# Patient Record
Sex: FEMALE | Race: WHITE | NOT HISPANIC OR LATINO | URBAN - METROPOLITAN AREA
[De-identification: names, ages, dates, MRNs, and addresses within clinical notes are randomized per-mention and may not be internally consistent; named-entity substitution may affect disease eponyms.]

---

## 2020-02-25 ENCOUNTER — OFFICE (OUTPATIENT)
Dept: URBAN - METROPOLITAN AREA CLINIC 101 | Facility: CLINIC | Age: 85
End: 2020-02-25
Payer: OTHER GOVERNMENT

## 2020-02-25 VITALS
WEIGHT: 174 LBS | TEMPERATURE: 98.1 F | HEART RATE: 80 BPM | DIASTOLIC BLOOD PRESSURE: 62 MMHG | SYSTOLIC BLOOD PRESSURE: 101 MMHG | HEIGHT: 64 IN

## 2020-02-25 DIAGNOSIS — K30 FUNCTIONAL DYSPEPSIA: ICD-10-CM

## 2020-02-25 DIAGNOSIS — K21.0 GASTRO-ESOPHAGEAL REFLUX DISEASE WITH ESOPHAGITIS: ICD-10-CM

## 2020-02-25 DIAGNOSIS — R13.19 OTHER DYSPHAGIA: ICD-10-CM

## 2020-02-25 PROCEDURE — 99204 OFFICE O/P NEW MOD 45 MIN: CPT

## 2020-06-18 ENCOUNTER — ON CAMPUS - OUTPATIENT (OUTPATIENT)
Dept: URBAN - METROPOLITAN AREA HOSPITAL 37 | Facility: HOSPITAL | Age: 85
End: 2020-06-18
Payer: OTHER GOVERNMENT

## 2020-06-18 DIAGNOSIS — K29.60 OTHER GASTRITIS WITHOUT BLEEDING: ICD-10-CM

## 2020-06-18 DIAGNOSIS — R13.10 DYSPHAGIA, UNSPECIFIED: ICD-10-CM

## 2020-06-18 DIAGNOSIS — K21.9 GASTRO-ESOPHAGEAL REFLUX DISEASE WITHOUT ESOPHAGITIS: ICD-10-CM

## 2020-06-18 PROCEDURE — 43239 EGD BIOPSY SINGLE/MULTIPLE: CPT | Performed by: INTERNAL MEDICINE

## 2020-09-21 ENCOUNTER — OFFICE (OUTPATIENT)
Dept: URBAN - METROPOLITAN AREA CLINIC 102 | Facility: CLINIC | Age: 85
End: 2020-09-21
Payer: OTHER GOVERNMENT

## 2020-09-21 VITALS
HEIGHT: 64 IN | SYSTOLIC BLOOD PRESSURE: 119 MMHG | TEMPERATURE: 97.9 F | HEART RATE: 96 BPM | DIASTOLIC BLOOD PRESSURE: 75 MMHG | WEIGHT: 171 LBS

## 2020-09-21 DIAGNOSIS — R13.19 OTHER DYSPHAGIA: ICD-10-CM

## 2020-09-21 DIAGNOSIS — K21.0 GASTRO-ESOPHAGEAL REFLUX DISEASE WITH ESOPHAGITIS: ICD-10-CM

## 2020-09-21 DIAGNOSIS — R07.89 OTHER CHEST PAIN: ICD-10-CM

## 2020-09-21 PROCEDURE — 99214 OFFICE O/P EST MOD 30 MIN: CPT | Performed by: NURSE PRACTITIONER

## 2020-09-21 RX ORDER — PANTOPRAZOLE SODIUM 40 MG/1
TABLET, DELAYED RELEASE ORAL
Qty: 90 | Refills: 3 | Status: ACTIVE
Start: 2020-09-21

## 2020-09-21 NOTE — SERVICEHPINOTES
CAMI HORTON   is a   88  female who presents for follow up. The chest pain got worse. + Acid reflux. Has been taking Omeprazole 40 mg BID. Saturday, had severe chest pain that radiated to the throat and burned. Still has dysphagia, chocks on everything both liquids and solids. The appetite is poor. Lost about 3 lbs since Feb. Gets full quickly. BRMoves bowel daily. Denies blood in stool, melena, constipation or diarrhea. Occasional lower abdominal pain, chronic. + Gas pain. BR

## 2021-04-01 ENCOUNTER — OFFICE (OUTPATIENT)
Dept: URBAN - METROPOLITAN AREA CLINIC 102 | Facility: CLINIC | Age: 86
End: 2021-04-01
Payer: OTHER GOVERNMENT

## 2021-04-01 VITALS
DIASTOLIC BLOOD PRESSURE: 88 MMHG | SYSTOLIC BLOOD PRESSURE: 140 MMHG | HEIGHT: 64 IN | TEMPERATURE: 97.9 F | HEART RATE: 78 BPM | WEIGHT: 169 LBS

## 2021-04-01 DIAGNOSIS — R10.84 GENERALIZED ABDOMINAL PAIN: ICD-10-CM

## 2021-04-01 DIAGNOSIS — K92.1 MELENA: ICD-10-CM

## 2021-04-01 DIAGNOSIS — R19.4 CHANGE IN BOWEL HABIT: ICD-10-CM

## 2021-04-01 DIAGNOSIS — R07.89 OTHER CHEST PAIN: ICD-10-CM

## 2021-04-01 DIAGNOSIS — R13.19 OTHER DYSPHAGIA: ICD-10-CM

## 2021-04-01 PROCEDURE — 99214 OFFICE O/P EST MOD 30 MIN: CPT

## 2021-04-01 NOTE — SERVICEHPINOTES
CAMI HORTON   is a   89  female who complains of bleeding. She reports blood in stool, both dark and bright red with clots. Started approx. 3 weeks ago and blood is occurring with every BM. Having diffuse abdominal pain and in chest. No appetite. Has lost approx. 10 lbs since Sept. BMs ~4 times daily. Soft and loose at times but has been solid as well. Today, blood in stool has looked like "chocolate". Constant abd pain which is diffuse and moves around sharp pain, not crampy. Pain worsens with PO intake, does not matter what she eats, even with water. Denies recent antibiotics or med changes. No vomiting. Baseline for her is every 2-3 days. Her last colonoscopy was in 2014 and was unremarkable. She has a h/o polyps.BRShe has also had continued dysphagia. Had EGD 6/2020--tortuous esophagus and esophageal stricture which was dilated. Benign biopsies. She did not notice a difference after dilation. She has continued on BID PPI since currently takes pantoprazole 40mg BID. Had a barium swallow after EGD in July which showed moderate to severe reflux, small hiatal hernia, presbyesophagus with intraesophageal stasis and reflux. No free air or extravasation of contrast, narrowing improved from prior.BRReports occasional chest pain which has been a chronic issue suspected due to GERD. She has a h/o a fib and h/o TIA. On ASA 81mg, no other blood thinners. Has had cardiac workup (due to chest pain) recently which was negative.Elizabeth is allergic to contrast.Labs 3/23/21 with normal CBC (hgb 13.3), normal lipase and LFTs.

## 2021-04-02 ENCOUNTER — OFFICE (OUTPATIENT)
Dept: URBAN - METROPOLITAN AREA CLINIC 34 | Facility: CLINIC | Age: 86
End: 2021-04-02
Payer: MEDICARE

## 2021-04-02 DIAGNOSIS — Z11.59 ENCOUNTER FOR SCREENING FOR OTHER VIRAL DISEASES: ICD-10-CM

## 2021-04-02 PROCEDURE — 99211 OFF/OP EST MAY X REQ PHY/QHP: CPT | Mod: CS,25 | Performed by: INTERNAL MEDICINE

## 2021-04-02 PROCEDURE — 99211 OFF/OP EST MAY X REQ PHY/QHP: CPT | Mod: 25,CS | Performed by: INTERNAL MEDICINE

## 2021-04-06 ENCOUNTER — ON CAMPUS - OUTPATIENT (OUTPATIENT)
Dept: URBAN - METROPOLITAN AREA HOSPITAL 37 | Facility: HOSPITAL | Age: 86
End: 2021-04-06
Payer: OTHER GOVERNMENT

## 2021-04-06 DIAGNOSIS — K63.89 OTHER SPECIFIED DISEASES OF INTESTINE: ICD-10-CM

## 2021-04-06 DIAGNOSIS — R63.4 ABNORMAL WEIGHT LOSS: ICD-10-CM

## 2021-04-06 DIAGNOSIS — K92.1 MELENA: ICD-10-CM

## 2021-04-06 PROCEDURE — 45380 COLONOSCOPY AND BIOPSY: CPT | Performed by: INTERNAL MEDICINE

## 2021-05-24 PROBLEM — R07.89 CHEST PAIN: Status: ACTIVE | Noted: 2021-04-01

## 2021-06-01 ENCOUNTER — ON CAMPUS - OUTPATIENT (OUTPATIENT)
Dept: URBAN - METROPOLITAN AREA HOSPITAL 37 | Facility: HOSPITAL | Age: 86
End: 2021-06-01
Payer: OTHER GOVERNMENT

## 2021-06-01 DIAGNOSIS — K21.9 GASTRO-ESOPHAGEAL REFLUX DISEASE WITHOUT ESOPHAGITIS: ICD-10-CM

## 2021-06-01 DIAGNOSIS — K22.2 ESOPHAGEAL OBSTRUCTION: ICD-10-CM

## 2021-06-01 DIAGNOSIS — K29.60 OTHER GASTRITIS WITHOUT BLEEDING: ICD-10-CM

## 2021-06-01 DIAGNOSIS — R13.10 DYSPHAGIA, UNSPECIFIED: ICD-10-CM

## 2021-06-01 DIAGNOSIS — R07.89 OTHER CHEST PAIN: ICD-10-CM

## 2021-06-01 PROCEDURE — 43249 ESOPH EGD DILATION <30 MM: CPT | Performed by: INTERNAL MEDICINE

## 2021-06-01 PROCEDURE — 43239 EGD BIOPSY SINGLE/MULTIPLE: CPT | Mod: 59 | Performed by: INTERNAL MEDICINE
